# Patient Record
Sex: MALE | Race: WHITE | NOT HISPANIC OR LATINO | Employment: STUDENT | URBAN - METROPOLITAN AREA
[De-identification: names, ages, dates, MRNs, and addresses within clinical notes are randomized per-mention and may not be internally consistent; named-entity substitution may affect disease eponyms.]

---
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
## 2024-11-03 ENCOUNTER — OFFICE VISIT (OUTPATIENT)
Dept: URGENT CARE | Facility: CLINIC | Age: 19
End: 2024-11-03
Payer: COMMERCIAL

## 2024-11-03 VITALS
RESPIRATION RATE: 18 BRPM | TEMPERATURE: 98 F | HEIGHT: 72 IN | HEART RATE: 76 BPM | DIASTOLIC BLOOD PRESSURE: 84 MMHG | WEIGHT: 179 LBS | OXYGEN SATURATION: 98 % | BODY MASS INDEX: 24.24 KG/M2 | SYSTOLIC BLOOD PRESSURE: 132 MMHG

## 2024-11-03 DIAGNOSIS — Z20.7 SCABIES EXPOSURE: Primary | ICD-10-CM

## 2024-11-03 PROCEDURE — 99203 OFFICE O/P NEW LOW 30 MIN: CPT | Mod: S$GLB,,, | Performed by: NURSE PRACTITIONER

## 2024-11-03 RX ORDER — PERMETHRIN 50 MG/G
CREAM TOPICAL ONCE
Qty: 60 G | Refills: 0 | Status: SHIPPED | OUTPATIENT
Start: 2024-11-03 | End: 2024-11-03

## 2024-11-03 NOTE — PATIENT INSTRUCTIONS
Scabies treatment and prevention discussed  If symptoms develop apply Permethrin as directed   Follow up as needed

## 2024-11-03 NOTE — PROGRESS NOTES
Subjective:      Patient ID: Dmitri Ca is a 19 y.o. male.    Vitals:  height is 6' (1.829 m) and weight is 81.2 kg (179 lb 0.2 oz). His temperature is 98 °F (36.7 °C). His blood pressure is 132/84 and his pulse is 76. His respiration is 18 and oxygen saturation is 98%.     Chief Complaint: Scabies exposure     19 year old male presents with possible scabies exposure. Denies current signs/symptoms of scabies. States that his roommate was seen by [facility] on Friday 10/25 and treated for suspected scabies.     Other  This is a new problem. Episode onset: 1 week ago. The problem occurs constantly. The problem has been unchanged. Pertinent negatives include no abdominal pain, anorexia, arthralgias, change in bowel habit, chest pain, chills, congestion, coughing, diaphoresis, fatigue, fever, headaches, joint swelling, myalgias, nausea, neck pain, numbness, rash, sore throat, swollen glands, urinary symptoms, vertigo, visual change, vomiting or weakness. Nothing aggravates the symptoms. He has tried nothing for the symptoms. The treatment provided no relief.       Constitution: Negative. Negative for chills, sweating, fatigue and fever.   HENT: Negative.  Negative for congestion and sore throat.    Neck: neck negative. Negative for neck pain.   Cardiovascular: Negative.  Negative for chest pain.   Eyes: Negative.    Respiratory: Negative.  Negative for cough.    Gastrointestinal: Negative.  Negative for abdominal pain, nausea and vomiting.   Endocrine: negative.   Genitourinary: Negative.    Musculoskeletal: Negative.  Negative for joint pain, joint swelling and muscle ache.   Skin: Negative.  Negative for rash and erythema.   Allergic/Immunologic: Negative.  Negative for itching.   Neurological: Negative.  Negative for history of vertigo, headaches and numbness.   Hematologic/Lymphatic: Negative.    Psychiatric/Behavioral: Negative.        Objective:     Physical Exam   Constitutional: He is oriented to person,  place, and time. He is cooperative.  Non-toxic appearance. He does not appear ill. No distress. normalawake  HENT:   Head: Normocephalic and atraumatic.   Ears:   Right Ear: Hearing normal.   Left Ear: Hearing normal.   Eyes: Conjunctivae are normal. Pupils are equal, round, and reactive to light. Right eye exhibits no discharge. Left eye exhibits no discharge. No scleral icterus. Extraocular movement intact   Neck: Neck supple.   Cardiovascular: Normal rate.   Pulmonary/Chest: Effort normal.   Abdominal: Normal appearance.   Musculoskeletal: Normal range of motion.         General: Normal range of motion.   Neurological: no focal deficit. He is alert and oriented to person, place, and time.   Skin: Skin is warm, dry, intact, not diaphoretic, not pale, no rash, not urticarial, not nodular, not pustular, not purpuric, not macular, not vesicular, not papular, not maculopapular and no abscessed. No abrasion, No burn, No bruising, No erythema, No ecchymosis, No lesion and No petechiae   Psychiatric: His behavior is normal. Mood, judgment and thought content normal.   Nursing note and vitals reviewed.      Assessment:     1. Scabies exposure        Plan:       Scabies exposure  -     permethrin (ELIMITE) 5 % cream; Apply topically once. for 1 dose  Dispense: 60 g; Refill: 0        Patient presents with possible scabies exposure. Normal exam. No rashes/lesions present. Prevention education. Instructed to apply Permethrin IF signs/symptoms of scabies develop. Discussed with patient who verbalizes understanding.      Patient Instructions   Scabies treatment and prevention discussed  If symptoms develop apply Permethrin as directed   Follow up as needed